# Patient Record
Sex: MALE | Race: WHITE | NOT HISPANIC OR LATINO | URBAN - METROPOLITAN AREA
[De-identification: names, ages, dates, MRNs, and addresses within clinical notes are randomized per-mention and may not be internally consistent; named-entity substitution may affect disease eponyms.]

---

## 2024-08-22 ENCOUNTER — HOSPITAL ENCOUNTER (EMERGENCY)
Facility: HOSPITAL | Age: 29
Discharge: HOME | End: 2024-08-22
Attending: STUDENT IN AN ORGANIZED HEALTH CARE EDUCATION/TRAINING PROGRAM
Payer: COMMERCIAL

## 2024-08-22 ENCOUNTER — CLINICAL SUPPORT (OUTPATIENT)
Dept: EMERGENCY MEDICINE | Facility: HOSPITAL | Age: 29
End: 2024-08-22
Payer: COMMERCIAL

## 2024-08-22 VITALS
OXYGEN SATURATION: 98 % | RESPIRATION RATE: 17 BRPM | DIASTOLIC BLOOD PRESSURE: 72 MMHG | HEIGHT: 72 IN | TEMPERATURE: 98.1 F | WEIGHT: 181 LBS | SYSTOLIC BLOOD PRESSURE: 124 MMHG | BODY MASS INDEX: 24.52 KG/M2 | HEART RATE: 53 BPM

## 2024-08-22 DIAGNOSIS — R55 VASOVAGAL EPISODE: Primary | ICD-10-CM

## 2024-08-22 LAB
ATRIAL RATE: 52 BPM
GLUCOSE BLD MANUAL STRIP-MCNC: 99 MG/DL (ref 74–99)
P AXIS: 78 DEGREES
P OFFSET: 179 MS
P ONSET: 132 MS
PR INTERVAL: 176 MS
Q ONSET: 220 MS
QRS COUNT: 9 BEATS
QRS DURATION: 106 MS
QT INTERVAL: 422 MS
QTC CALCULATION(BAZETT): 392 MS
QTC FREDERICIA: 402 MS
R AXIS: 82 DEGREES
T AXIS: 75 DEGREES
T OFFSET: 431 MS
VENTRICULAR RATE: 52 BPM

## 2024-08-22 PROCEDURE — 99283 EMERGENCY DEPT VISIT LOW MDM: CPT

## 2024-08-22 PROCEDURE — 82947 ASSAY GLUCOSE BLOOD QUANT: CPT

## 2024-08-22 PROCEDURE — 93005 ELECTROCARDIOGRAM TRACING: CPT

## 2024-08-22 PROCEDURE — 99284 EMERGENCY DEPT VISIT MOD MDM: CPT | Performed by: STUDENT IN AN ORGANIZED HEALTH CARE EDUCATION/TRAINING PROGRAM

## 2024-08-22 ASSESSMENT — COLUMBIA-SUICIDE SEVERITY RATING SCALE - C-SSRS
1. IN THE PAST MONTH, HAVE YOU WISHED YOU WERE DEAD OR WISHED YOU COULD GO TO SLEEP AND NOT WAKE UP?: NO
6. HAVE YOU EVER DONE ANYTHING, STARTED TO DO ANYTHING, OR PREPARED TO DO ANYTHING TO END YOUR LIFE?: NO
2. HAVE YOU ACTUALLY HAD ANY THOUGHTS OF KILLING YOURSELF?: NO

## 2024-08-22 ASSESSMENT — PAIN DESCRIPTION - PAIN TYPE: TYPE: ACUTE PAIN

## 2024-08-22 ASSESSMENT — PAIN - FUNCTIONAL ASSESSMENT: PAIN_FUNCTIONAL_ASSESSMENT: 0-10

## 2024-08-22 ASSESSMENT — PAIN SCALES - GENERAL: PAINLEVEL_OUTOF10: 0 - NO PAIN

## 2024-08-22 NOTE — ED TRIAGE NOTES
Patient presents to the Emergency department with a chief complaint of near syncope. Patient was in the PACU with family when he became diaphoretic and felt like he was going to pass out. Patient was brought down by PACU staff for evaluation. Upon arrival, patient endorses resolution of symptoms. Patient he felt faint and felt like he was going to pass out, but never did. Patient denies any chest pain, shortness of breath, or palpitations surrounding the time of the event. Patient denies any medical complaints at this time. Patient denies any medical history.

## 2024-08-22 NOTE — ED PROVIDER NOTES
History of Present Illness     History provided by: Patient  Limitations to History: None  External Records Reviewed: None    HPI:  Say Liz is a 29 y.o. male presents to the emergency department and evaluation of lightheadedness and a near syncopal event.  The patient was at the outpatient surgery center where his mother recently had surgery.  He states that standing at her bedside seeing her with tubes in her face and in postoperative condition he began to become lightheaded, sweaty and feeling like he was going to pass out.  He sat down and did not in fact syncopized.  He denied headache, chest pain, shortness of breath, vomiting or diarrhea.  He denies recent sick symptoms.  He denies a personal or family history of cardiac disease or sudden cardiac death.  He does go to the doctor regularly and has followed up with them routinely and has no medical problems.    Physical Exam   Triage vitals:  T    HR    BP    RR    O2        Awake alert and oriented, no acute distress regular rate rhythm, 2+ radial, 2+ DP and PT pulses bilaterally, extremities are warm and well-perfused with no delayed cap refill  No respiratory distress, abdomen is soft nontender nondistended  No rashes  Awake alert and oriented x 4, no facial asymmetry, pupils are equal round reactive to light and accommodation, no dysarthria, no aphasia, tongue protrusion is midline, moving all 4 extremities with no deficits in strength, normal sensation in all 4 extremities    Medical Decision Making & ED Course   Medical Decision Makin y.o. male presents to the emergency department hemodynamically stable, no triage vitals were obtained but heart rate of 50s, patient states that he is always bradycardic, blood pressure 136/78, SpO2 of 98% and respirations of 18 and regular.  His history is consistent with a vasovagal episode in the context of seeing his mother in a postoperative state.  I have extremely low concern for ACS, he is PERC  negative, symmetric pulses and lack of pain make aortic dissection exceedingly unlikely.  Patient has been previously well no concern for pneumonia or other intrapulmonary process.  Patient is already back to his asymptomatic baseline.  He was screened with an EKG which is nonconcerning.  He was tested with point-of-care glucose is not found to be hypoglycemic.  He was tolerating p.o. intake.  He was discharged with instructions to return to the emergency department for chest pain or shortness of breath.  He was also instructed to return for syncopal symptoms.  He was told to follow-up with his primary care physician.  ----         Social Determinants of Health which Significantly Impact Care: None identified       Chronic conditions affecting the patient's care: See HPI    The patient was discussed with the following consultants/services: Please see ED course for consult transcript        ED Course:  ED Course as of 08/22/24 1421   Thu Aug 22, 2024   1407 EKG shows sinus bradycardia with sinus arrhythmia rate of 50 bpm, normal axis, normal intervals, does show J-point elevation consistent with the EKG of a young 29-year-old healthy male otherwise no ST or T wave abnormalities indicative of acute ischemia electrolyte abnormalities, no evidence of right heart strain, no delayed precordial R wave progression.  For MARGE [GA]      ED Course User Index  [GA] Toi Headley MD         Diagnoses as of 08/22/24 1421   Vasovagal episode     Disposition   As a result of the work-up, the patient was discharged home.  he was informed of his diagnosis and instructed to come back with any concerns or worsening of condition.  he and was agreeable to the plan as discussed above.  he was given the opportunity to ask questions.  All of the patient's questions were answered.    Procedures   Procedures    Patient was seen and discussed with the attending of record.    Toi Headley MD  Emergency Medicine     Toi Headley,  MD  Resident  08/22/24 [id number]

## 2024-08-22 NOTE — DISCHARGE INSTRUCTIONS
You were seen today in the emergency department out of concern for an near passing out.  Your story is consistent with a vasovagal near syncopal event.  If you have chest pain, lightheadedness or pass out, or shortness of breath you need to return to the emergency department.  If you have another episode of this you need to return to the emergency department.  Please follow-up with your primary care physician.